# Patient Record
Sex: FEMALE
[De-identification: names, ages, dates, MRNs, and addresses within clinical notes are randomized per-mention and may not be internally consistent; named-entity substitution may affect disease eponyms.]

---

## 2018-03-08 ENCOUNTER — HOSPITAL ENCOUNTER (INPATIENT)
Dept: HOSPITAL 95 - BC | Age: 28
LOS: 1 days | Discharge: HOME | End: 2018-03-09
Admitting: ADVANCED PRACTICE MIDWIFE
Payer: COMMERCIAL

## 2018-03-08 VITALS — BODY MASS INDEX: 33.2 KG/M2 | HEIGHT: 62.99 IN | WEIGHT: 187.39 LBS

## 2018-03-08 DIAGNOSIS — Z3A.39: ICD-10-CM

## 2018-03-08 LAB
BASOPHILS # BLD AUTO: 0.05 K/MM3 (ref 0–0.23)
BASOPHILS NFR BLD AUTO: 0 % (ref 0–2)
DEPRECATED RDW RBC AUTO: 45.6 FL (ref 35.1–46.3)
EOSINOPHIL # BLD AUTO: 0.33 K/MM3 (ref 0–0.68)
EOSINOPHIL NFR BLD AUTO: 2 % (ref 0–6)
ERYTHROCYTE [DISTWIDTH] IN BLOOD BY AUTOMATED COUNT: 13.6 % (ref 11.7–14.2)
HCT VFR BLD AUTO: 38.6 % (ref 33–51)
HGB BLD-MCNC: 12.9 G/DL (ref 11.5–16)
IMM GRANULOCYTES # BLD AUTO: 0.3 K/MM3 (ref 0–0.1)
IMM GRANULOCYTES NFR BLD AUTO: 2 % (ref 0–1)
LYMPHOCYTES # BLD AUTO: 3.33 K/MM3 (ref 0.84–5.2)
LYMPHOCYTES NFR BLD AUTO: 20 % (ref 21–46)
MCHC RBC AUTO-ENTMCNC: 33.4 G/DL (ref 31.5–36.5)
MCV RBC AUTO: 92 FL (ref 80–100)
MONOCYTES # BLD AUTO: 1.13 K/MM3 (ref 0.16–1.47)
MONOCYTES NFR BLD AUTO: 7 % (ref 4–13)
NEUTROPHILS # BLD AUTO: 11.96 K/MM3 (ref 1.96–9.15)
NEUTROPHILS NFR BLD AUTO: 70 % (ref 41–73)
NRBC # BLD AUTO: 0 K/MM3 (ref 0–0.02)
NRBC BLD AUTO-RTO: 0 /100 WBC (ref 0–0.2)
PLATELET # BLD AUTO: 213 K/MM3 (ref 150–400)

## 2018-03-08 PROCEDURE — 0HQ9XZZ REPAIR PERINEUM SKIN, EXTERNAL APPROACH: ICD-10-PCS | Performed by: ADVANCED PRACTICE MIDWIFE

## 2018-03-09 LAB
BASOPHILS # BLD AUTO: 0.06 K/MM3 (ref 0–0.23)
BASOPHILS NFR BLD AUTO: 0 % (ref 0–2)
DEPRECATED RDW RBC AUTO: 48 FL (ref 35.1–46.3)
EOSINOPHIL # BLD AUTO: 0.39 K/MM3 (ref 0–0.68)
EOSINOPHIL NFR BLD AUTO: 3 % (ref 0–6)
ERYTHROCYTE [DISTWIDTH] IN BLOOD BY AUTOMATED COUNT: 14 % (ref 11.7–14.2)
HCT VFR BLD AUTO: 36.2 % (ref 33–51)
HGB BLD-MCNC: 11.9 G/DL (ref 11.5–16)
IMM GRANULOCYTES # BLD AUTO: 0.18 K/MM3 (ref 0–0.1)
IMM GRANULOCYTES NFR BLD AUTO: 1 % (ref 0–1)
LYMPHOCYTES # BLD AUTO: 2.53 K/MM3 (ref 0.84–5.2)
LYMPHOCYTES NFR BLD AUTO: 19 % (ref 21–46)
MCHC RBC AUTO-ENTMCNC: 32.9 G/DL (ref 31.5–36.5)
MCV RBC AUTO: 93 FL (ref 80–100)
MONOCYTES # BLD AUTO: 0.95 K/MM3 (ref 0.16–1.47)
MONOCYTES NFR BLD AUTO: 7 % (ref 4–13)
NEUTROPHILS # BLD AUTO: 9.46 K/MM3 (ref 1.96–9.15)
NEUTROPHILS NFR BLD AUTO: 70 % (ref 41–73)
NRBC # BLD AUTO: 0 K/MM3 (ref 0–0.02)
NRBC BLD AUTO-RTO: 0 /100 WBC (ref 0–0.2)
PLATELET # BLD AUTO: 176 K/MM3 (ref 150–400)

## 2021-05-07 NOTE — NUR
ARRIVED LAST NIGHT WITH REPORT OF BLEEDING. PATIENT HAS A PLACENTA PREVIA.
PATIENT DENIES ANY FURTHER BLEEDING. WILL DISCHARGE TO HOME. WAITING FOR HER
RIDE

## 2021-05-17 NOTE — NUR
05/17/21 1102 Gia Wyatt
VIABLE MALE INFANT BORN [birth year], APGARS 9/9; WEIGHT 7-11 ([id number] GMS);
LENGTH - 20; HEAD - 13.5; CHEST 13.5. CORD SEGMENT GIVEN TO [facility] RT
FOR CORD GASES; CORD BLOOD GIVEN TO NATHAN AYALA RN; BILATERAL
FALLOPIAN TUBES LABELED AND SENT TO PATHOLOGY.